# Patient Record
Sex: MALE | Race: WHITE
[De-identification: names, ages, dates, MRNs, and addresses within clinical notes are randomized per-mention and may not be internally consistent; named-entity substitution may affect disease eponyms.]

---

## 2017-10-02 ENCOUNTER — HOSPITAL ENCOUNTER (EMERGENCY)
Dept: HOSPITAL 59 - ER | Age: 42
Discharge: HOME | End: 2017-10-02
Payer: SELF-PAY

## 2017-10-02 DIAGNOSIS — K04.7: Primary | ICD-10-CM

## 2017-10-02 DIAGNOSIS — K02.9: ICD-10-CM

## 2017-10-02 PROCEDURE — 99282 EMERGENCY DEPT VISIT SF MDM: CPT

## 2017-10-02 NOTE — EMERGENCY DEPARTMENT RECORD
History of Present Illness





- General


Stated complaint: ABCESS LT SIDE MOUTH


Time Seen by Provider: 10/02/17 20:33


Source: Patient


Mode of Arrival: Ambulatory


Limitations: No limitations





- History of Present Illness


Initial comments: 





43 yo male presents to ED with a CC of dental pain to the left upper posterior 

molar for the past several days.  Patient reports that the tooth broke off 

several months ago, but has only recently become painful.  Patient reports mild 

swelling to the left face as well.  Patient denies pain with eye movement, ear 

pain, or health problems other than asthma.


MD complaint: Tooth pain


Onset/Timing: 3


-: Days(s)


Severity: Moderate


Quality: Aching


Consistency: Constant


Improves with: None


Worsens with: None


Context- Dental: History of dental caries





- Related Data


 Previous Rx's











 Medication  Instructions  Recorded


 


Clindamycin HCl 300 mg PO QID #28 capsule 10/02/17











 Allergies











Allergy/AdvReac Type Severity Reaction Status Date / Time


 


Carbapenems Allergy Unknown HIVES Unverified 10/02/17 20:38


 


Cephalosporins Allergy Unknown HIVES Unverified 10/02/17 20:38


 


Penicillins Allergy Unknown HIVES Unverified 10/02/17 20:38


 


sulfamethoxazole Allergy  SKIN Verified 10/02/17 20:38





[From Bactrim]   IRRITATION  


 


trimethoprim [From Bactrim] Allergy  SKIN Verified 10/02/17 20:38





   IRRITATION  














Review of Systems


Constitutional: Denies: Chills, Fever, Malaise, Night sweats


Eyes: Denies: Eye discharge, Eye pain


ENT: Reports: Dental pain.  Denies: Ear pain, Epistaxis


Respiratory: Denies: Cough, Dyspnea


Cardiovascular: Denies: Chest pain, Dyspnea on exertion


Endocrine: Denies: Fatigue, Heat or cold intolerance


Gastrointestinal: Denies: Abdominal pain, Nausea, Vomiting


Genitourinary: Denies: Testicular pain, Testicular mass


Musculoskeletal: Denies: Arthralgia, Back pain


Skin: Denies: Bruising, Change in color


Neurological: Denies: Abnormal gait, Confusion, Headache, Seizure


Psychiatric: Denies: Anxiety


Hematological/Lymphatic: Denies: Anemia, Blood Clots





Physical Exam





- General


General Appearance: Alert, Oriented x3, Cooperative, Mild distress


Limitations: No limitations





- Head


Head exam: Atraumatic, Normocephalic, Other (mild STS to the left cheek region 

on examination)


Head exam detail: negative: Abrasion, Contusion, Healy's sign, General 

tenderness, Hematoma, Laceration





- Eye


Eye exam: Normal appearance, EOMI.  negative: Conjunctival injection, 

Periorbital swelling, Periorbital tenderness, Scleral icterus





- ENT


Ear exam: negative: Auricular hematoma, Auricular trauma


Nasal Exam: negative: Active bleeding, Discharge, Dried blood, Foreign body


Mouth exam: negative: Drooling, Laceration, Muffled voice, Tongue elevation


Teeth exam: Dental caries, Dental tenderness #, Fractured tooth #


Image of Mouth/Teeth: 


  __________________________














  __________________________





 1 - Broken tooth on examination, no gingival swelling on examination








- Neck


Neck exam: Normal inspection.  negative: Meningismus, Tenderness





- Respiratory


Respiratory exam: Normal lung sounds bilaterally.  negative: Rales, Respiratory 

distress, Rhonchi, Stridor





- Cardiovascular


Cardiovascular Exam: Regular rate, Normal rhythm, Normal heart sounds





- GI/Abdominal


GI/Abdominal exam: Soft.  negative: Rebound, Rigid, Tenderness





- Rectal


Rectal exam: Deferred





- 


 exam: Deferred





- Extremities


Extremities exam: Normal inspection.  negative: Pedal edema, Tenderness





- Back


Back exam: Denies: CVA tenderness (R), CVA tenderness (L)





- Neurological


Neurological exam: Alert, Normal gait, Oriented X3





- Psychiatric


Psychiatric exam: Normal affect, Normal mood





- Skin


Skin exam: Normal color.  negative: Abrasion


Type of lesion: negative: abrasion





Course





- Reevaluation(s)


Reevaluation #1: 





10/02/17 20:56


Symptoms appears c/w dental abscess, no gingival abscess is present.  


Will initiate treatment with Clindamycin with instructions to follow-up with 

his dentist in 5-7 days as directed.





Disposition


Disposition: Discharge


Clinical Impression: 


 Dental caries





Disposition: Home, Self-Care


Condition: (2) Stable


Instructions:  Dental Abscess (ED)


Additional Instructions: 


Return to ED if your symptoms worsen or if you have any concerns.


Clindamycin as directed.


Follow-up with your dentist in 5-7 days as directed.


Prescriptions: 


Clindamycin HCl 300 mg PO QID #28 capsule


Time of Disposition: 20:34





Quality





- Quality Measures


Quality Measures: N/A





- Blood Pressure Screening


Does Patient Have Any of the Following: No


Blood Pressure Classification: Hypertensive Reading


Systolic Measurement: 142


Diastolic Measurement: 94


Screening for High Blood Pressure: < First Hypertensive BP, F/U Documented > [

]


First Hypertensive Follow-up Interventions: Referral to alternative/primary 

care provider.

## 2018-01-27 ENCOUNTER — HOSPITAL ENCOUNTER (EMERGENCY)
Dept: HOSPITAL 59 - ER | Age: 43
Discharge: LEFT BEFORE DISCHARGE | End: 2018-01-27
Payer: SELF-PAY

## 2018-01-27 DIAGNOSIS — Z53.20: Primary | ICD-10-CM

## 2018-12-11 ENCOUNTER — HOSPITAL ENCOUNTER (EMERGENCY)
Dept: HOSPITAL 59 - ER | Age: 43
Discharge: HOME | End: 2018-12-11
Payer: COMMERCIAL

## 2018-12-11 DIAGNOSIS — I10: ICD-10-CM

## 2018-12-11 DIAGNOSIS — Z87.891: ICD-10-CM

## 2018-12-11 DIAGNOSIS — R05: ICD-10-CM

## 2018-12-11 DIAGNOSIS — J01.90: Primary | ICD-10-CM

## 2018-12-11 PROCEDURE — 99282 EMERGENCY DEPT VISIT SF MDM: CPT

## 2018-12-11 NOTE — EMERGENCY DEPARTMENT RECORD
History of Present Illness





- General


Chief complaint: Cold


Stated complaint: FWEVER,SINUS/CHEST CONGESTION


Time Seen by Provider: 18 22:43


Source: Patient


Mode of Arrival: Ambulatory


Limitations: No limitations





- History of Present Illness


Initial comments: 


The patient is here due to being ill for a week. He has had a cough, nasal 

congestion, and sputum production for a week. For the 2 days he has had a low 

grade fever and body aches. There has been no HA, CP, SOB, or vomiting.





MD complaint: Other


Onset/Timin


-: Days(s)


Improves with: None


Worsens with: None


Associated Symptoms: Cough





- Related Data


 Previous Rx's











 Medication  Instructions  Recorded


 


Doxycycline Monohydrate [Mondoxyne 100 mg PO BID #14 capsule 18





Nl]  











 Allergies











Allergy/AdvReac Type Severity Reaction Status Date / Time


 


Carbapenems Allergy Unknown HIVES Unverified 18 18:17


 


Cephalosporins Allergy Unknown HIVES Unverified 18 18:17


 


Penicillins Allergy Unknown HIVES Unverified 18 18:17


 


sulfamethoxazole Allergy  SKIN Unverified 18 18:17





[From Bactrim]   IRRITATION  


 


trimethoprim [From Bactrim] Allergy  SKIN Unverified 18 18:17





   IRRITATION  














Travel Screening





- Travel/Exposure Within Last 30 Days


Have you traveled within the last 30 days?: No





- Travel/Exposure Within Last Year


Have you traveled outside the [place] in the last year?: No





- Additonal Travel Details


Have you been exposed to anyone with a communicable illness?: No





- Travel Symptoms


Symptom Screening: None





Review of Systems


Constitutional: Denies: Chills, Fever


Eyes: Denies: Eye discharge


ENT: Reports: Congestion


Respiratory: Reports: Cough.  Denies: Dyspnea





Past Medical History





- SOCIAL HISTORY


Smoking Status: Former smoker


Alcohol Use: None


Drug Use: None





- RESPIRATORY


Hx Respiratory Disorders: Yes


Hx Asthma: Yes





- CARDIOVASCULAR


Hx Cardio Disorders: Yes


Hx Hypertension: Yes





- NEURO


Hx Neuro Disorders: No





- GI


Hx GI Disorders: No





- 


Hx Genitourinary Disorders: No





- ENDOCRINE


Hx Endocrine Disorders: No





- MUSCULOSKELETAL


Hx Musculoskeletal Disorders: No





- PSYCH


Hx Psych Problems: No





- HEMATOLOGY/ONCOLOGY


Hx Hematology/Oncology Disorders: No





Family Medical History


Any Significant Family History?: No





Physical Exam





- General


General Appearance: Alert, Oriented x3, Cooperative, No acute distress





- Head


Head exam: Atraumatic, Normocephalic, Normal inspection





- Eye


Eye exam: Normal appearance, PERRL





- ENT


Throat exam: Normal inspection.  negative: Tonsillar erythema, Tonsillar exudate





- Neck


Neck exam: Normal inspection, Full ROM.  negative: Tenderness





- Respiratory


Respiratory exam: Normal lung sounds bilaterally.  negative: Respiratory 

distress





- Cardiovascular


Cardiovascular Exam: Regular rate, Normal rhythm, Normal heart sounds





- GI/Abdominal


GI/Abdominal exam: Soft, Normal bowel sounds.  negative: Tenderness





- Extremities


Extremities exam: Normal inspection, Full ROM, Normal capillary refill.  

negative: Tenderness





- Back


Back exam: Reports: Normal inspection





- Neurological


Neurological exam: Alert, Normal gait.  negative: Abnormal gait, Motor sensory 

deficit





- Skin


Skin exam: negative: Rash





Course





 Vital Signs











  18





  22:42


 


Temperature 99.7 F H


 


Pulse Rate [ 114 H





Pulse Ox Probe] 


 


Respiratory 20





Rate 


 


Blood Pressure 146/94





[Left Arm] 


 


Pulse Ox 98














- Reevaluation(s)


Reevaluation #1: 


The patient is doing better at this time. We will discharge on the Doxycycline.


18 23:18








Disposition


Disposition: Discharge


Clinical Impression: 


Sinusitis


Qualifiers:


 Sinusitis location: unspecified location Chronicity: acute Recurrence: not 

specified as recurrent Qualified Code(s): J01.90 - Acute sinusitis, unspecified





Disposition: Home, Self-Care


Condition: (2) Stable


Instructions:  Sinusitis (ED)


Additional Instructions: 


Please take the Doxycycline as directed and use Tylenol or Motrin for fever and 

body aches. Please see your doctor if not better in 3 days and return to the ER 

for any worsening symptoms.


Prescriptions: 


Doxycycline Monohydrate [Mondoxyne Nl] 100 mg PO BID #14 capsule


Forms:  Patient Portal Access


Time of Disposition: 23:20





Quality





- Quality Measures


Quality Measures: N/A





- Blood Pressure Screening


View Details: Yes


Does Patient Have Any of the Following: No


Blood Pressure Classification: Pre-Hypertensive BP Reading


Systolic Measurement: 134


Diastolic Measurement: 78


Screening for High Blood Pressure: < Pre-Hypertensive BP, F/U Documented > [

]


Pre-Hypertensive Follow-up Interventions: Referral to alternative/primary care 

provider.

## 2019-03-13 ENCOUNTER — HOSPITAL ENCOUNTER (EMERGENCY)
Dept: HOSPITAL 59 - ER | Age: 44
Discharge: HOME | End: 2019-03-13
Payer: COMMERCIAL

## 2019-03-13 DIAGNOSIS — I10: ICD-10-CM

## 2019-03-13 DIAGNOSIS — D72.829: ICD-10-CM

## 2019-03-13 DIAGNOSIS — Z87.891: ICD-10-CM

## 2019-03-13 DIAGNOSIS — M10.072: Primary | ICD-10-CM

## 2019-03-13 LAB
BASOPHILS NFR BLD: 0.3 % (ref 0–6)
CRP SERPL-MCNC: 0.3 MG/DL (ref ?–0.5)
EOSINOPHIL NFR BLD: 0.9 % (ref 0–6)
ERYTHROCYTE [DISTWIDTH] IN BLOOD BY AUTOMATED COUNT: 13.1 % (ref 11.5–14.5)
ERYTHROCYTE [SEDIMENTATION RATE] IN BLOOD: 5 MM/HR (ref 0–15)
GRANULOCYTES NFR BLD: 71.8 % (ref 47–80)
HCT VFR BLD CALC: 47.8 % (ref 42–52)
HGB BLD-MCNC: 16.5 GM/DL (ref 14–18)
INR PPP: 1
LYMPHOCYTES NFR BLD AUTO: 18 % (ref 16–45)
MCH RBC QN AUTO: 31.2 PG (ref 27–33)
MCHC RBC AUTO-ENTMCNC: 34.5 G/DL (ref 32–36)
MCV RBC AUTO: 90.4 FL (ref 81–97)
MONOCYTES NFR BLD: 9 % (ref 0–9)
PLATELET # BLD: 337 K/UL (ref 130–400)
PMV BLD AUTO: 10.1 FL (ref 7.4–10.4)
PROTHROMBIN TIME: 10.3 SECONDS (ref 9.5–12.1)
RBC # BLD AUTO: 5.29 M/UL (ref 4.4–5.7)
WBC # BLD AUTO: 15.4 K/UL (ref 4.2–12.2)

## 2019-03-13 PROCEDURE — 85651 RBC SED RATE NONAUTOMATED: CPT

## 2019-03-13 PROCEDURE — 99284 EMERGENCY DEPT VISIT MOD MDM: CPT

## 2019-03-13 PROCEDURE — 86430 RHEUMATOID FACTOR TEST QUAL: CPT

## 2019-03-13 PROCEDURE — 84550 ASSAY OF BLOOD/URIC ACID: CPT

## 2019-03-13 PROCEDURE — 99283 EMERGENCY DEPT VISIT LOW MDM: CPT

## 2019-03-13 PROCEDURE — 86140 C-REACTIVE PROTEIN: CPT

## 2019-03-13 PROCEDURE — 85025 COMPLETE CBC W/AUTO DIFF WBC: CPT

## 2019-03-13 PROCEDURE — 85610 PROTHROMBIN TIME: CPT

## 2019-03-13 NOTE — EMERGENCY DEPARTMENT RECORD
History of Present Illness





- General


Stated complaint: LT ANKLE PAIN


Time Seen by Provider: 03/13/19 20:11


Source: Patient





- History of Present Illness


Initial comments: 





Patient awakened around 1400 today with left ankle pain. He denies trauma or 

injury to it. He notices a very sensitive area of reddened skin on the medial 

side of his ankle and has severe pain on motion of the ankle joint. He denies 

fevers, chills, other joint pains. He though he had gout in his other foot in 

the toes about a year ago but is uncertain.  He is on multiple medications, 

none of which are new.  





- Related Data


 Previous Rx's











 Medication  Instructions  Recorded


 


Clindamycin HCl [Cleocin HCl] 300 mg PO Q6H #27 cap 03/13/19


 


Indomethacin [Indocin] 50 mg PO TID #20 cap 03/13/19











 Allergies











Allergy/AdvReac Type Severity Reaction Status Date / Time


 


Carbapenems Allergy Unknown HIVES Unverified 02/22/19 16:10


 


Cephalosporins Allergy Unknown HIVES Unverified 02/22/19 16:10


 


Penicillins Allergy Unknown HIVES Unverified 02/22/19 16:10


 


sulfamethoxazole Allergy  SKIN Unverified 02/22/19 16:10





[From Bactrim]   IRRITATION  


 


trimethoprim [From Bactrim] Allergy  SKIN Unverified 02/22/19 16:10





   IRRITATION  














Review of Systems


Reviewed: No additional complaints except as noted below


Constitutional: Reports: As per HPI.  Denies: Chills, Fever, Malaise, Night 

sweats, Weakness, Weight change


Eyes: Reports: As per HPI.  Denies: Eye discharge, Eye pain, Photophobia, 

Vision change


ENT: Reports: As per HPI.  Denies: Congestion, Dental pain, Ear pain, Epistaxis

, Hearing loss, Throat pain


Respiratory: Reports: As per HPI.  Denies: Cough, Dyspnea, Hemoptysis, Stridor, 

Wheezes


Cardiovascular: Reports: As per HPI.  Denies: Arrhythmia, Chest pain, Dyspnea 

on exertion, Edema, Murmurs, Orthopnea, Palpitations, Paroxysmal nocturnal 

dyspnea, Rheumatic Fever, Syncope


Endocrine: Reports: As per HPI.  Denies: Fatigue, Heat or cold intolerance, 

Polydipsia, Polyuria


Gastrointestinal: Reports: As per HPI.  Denies: Abdominal pain, Constipation, 

Diarrhea, Hematemesis, Hematochezia, Melena, Nausea, Vomiting


Genitourinary: Reports: As per HPI.  Denies: Dysuria, Frequency, Hematuria, 

Incontinence, Retention, Testicular pain, Testicular mass, Urgency


Musculoskeletal: Reports: As per HPI.  Denies: Arthralgia, Back pain, Gout, 

Joint swelling, Myalgia, Neck pain


Skin: Reports: As per HPI.  Denies: Bruising, Change in color, Change in hair/

nails, Lesions, Pruritus, Rash


Neurological: Reports: As per HPI.  Denies: Abnormal gait, Confusion, Headache, 

Numbness, Paresthesias, Seizure, Tingling, Tremors, Vertigo, Weakness


Psychiatric: Reports: As per HPI.  Denies: Anxiety, Auditory hallucinations, 

Depression, Homicidal thoughts, Suicidal thoughts, Visual hallucinations


Hematological/Lymphatic: Reports: As per HPI.  Denies: Anemia, Blood Clots, 

Easy bleeding, Easy bruising, Swollen glands





Past Medical History





- SOCIAL HISTORY


Smoking Status: Former smoker


Drug Use: None





- RESPIRATORY


Hx Respiratory Disorders: Yes


Hx Asthma: Yes





- CARDIOVASCULAR


Hx Cardio Disorders: Yes


Hx Hypertension: Yes





- NEURO


Hx Neuro Disorders: No





- GI


Hx GI Disorders: No





- 


Hx Genitourinary Disorders: No





- ENDOCRINE


Hx Endocrine Disorders: No





- MUSCULOSKELETAL


Hx Musculoskeletal Disorders: No





- PSYCH


Hx Psych Problems: No





- HEMATOLOGY/ONCOLOGY


Hx Hematology/Oncology Disorders: No





Physical Exam





- General


General Appearance: Alert, Oriented x3, Cooperative, No acute distress





- Head


Head exam: Normal inspection





- Eye


Eye exam: Normal appearance, PERRL, EOMI.  negative: Conjunctival injection, 

Nystagmus


Pupils: Normal accommodation





- ENT


ENT exam: Normal exam, Mucous membranes moist, Normal external ear exam, Normal 

orophraynx, TM's normal bilaterally


Ear exam: Normal external inspection.  negative: External canal tenderness


Nasal Exam: Normal inspection.  negative: Discharge, Sinus tenderness


Mouth exam: Normal external inspection, Tongue normal


Teeth exam: Normal inspection.  negative: Dental caries


Throat exam: Normal inspection.  negative: Tonsillar erythema, Tonsillar exudate





- Neck


Neck exam: Normal inspection, Full ROM.  negative: Lymphadenopathy, Meningismus

, Tenderness





- Respiratory


Respiratory exam: Normal lung sounds bilaterally.  negative: Chest wall 

tenderness, Respiratory distress





- Cardiovascular


Cardiovascular Exam: Regular rate, Normal rhythm, Normal heart sounds





- GI/Abdominal


GI/Abdominal exam: Soft, Normal bowel sounds.  negative: Tenderness





- Rectal


Rectal exam: Deferred





- 


 exam: Deferred





- Extremities


Extremities exam: Normal inspection, Full ROM, Normal capillary refill, 

Tenderness (left ankle joint with painful ROM, no edema, medial aspect of ankle 

and foot with erythema on skin surface, no bony tenderness to foot, CMS intact 

distally.).  negative: Calf tenderness





- Back


Back exam: Reports: Normal inspection, Full ROM.  Denies: Muscle spasm, Rash 

noted, Tenderness





- Neurological


Neurological exam: Alert, Normal gait, Oriented X3, Reflexes normal





- Psychiatric


Psychiatric exam: Normal affect, Normal mood





- Skin


Skin exam: Dry, Intact, Normal color, Warm





Course





- Reevaluation(s)


Reevaluation #1: 


Discussed results with patient regarding elevated uric acid, and mildly 

elevated WBC. Patient is already on norco 10's TID for back pain. Will allow 

PCP to manage pain through office. Will give indocin and clindamycin antibiotic 

to cover cellulitis. 


03/13/19 21:18








Medical Decision Making





- Management Options


MDM Management: No Additional Work-up Planned (PCP follow up in office.)





- Data Complexity


MDM Data: Labs Ordered and/or Reviewed (uric acid 8.4, CRP 0.30, ), X-Ray 

Ordered and/or Reviewed (ankel xray: STS no bony abnormality per radiologist.)





- Lab Data


Result diagrams: 


 03/13/19 20:25








Disposition


Disposition: Discharge


Clinical Impression: 


Gout of left ankle


Qualifiers:


 Gout etiology: unspecified cause Chronicity: acute Qualified Code(s): M10.9 - 

Gout, unspecified





Disposition: Home, Self-Care


Condition: (1) Good


Instructions:  Gout (ED), Low Purine Diet (ED)


Additional Instructions: 


No ambulation or weight bearing on left ankle.


Indocin as directed for gout.


Clindamycin as directed to cover cellulitis.


Off work 3-14-19 through 3-15-19.


Prescriptions: 


Clindamycin HCl [Cleocin HCl] 300 mg PO Q6H #27 cap


Indomethacin [Indocin] 50 mg PO TID #20 cap





Quality





- Quality Measures


Quality Measures: N/A





- Blood Pressure Screening


Does Patient Have Any of the Following: No


Blood Pressure Classification: Hypertensive Reading


Systolic Measurement: 141


Diastolic Measurement: 101


Screening for High Blood Pressure: Patient Exclusion, Hx of HTN []